# Patient Record
Sex: FEMALE | URBAN - METROPOLITAN AREA
[De-identification: names, ages, dates, MRNs, and addresses within clinical notes are randomized per-mention and may not be internally consistent; named-entity substitution may affect disease eponyms.]

---

## 2022-11-29 ENCOUNTER — OFFICE VISIT (OUTPATIENT)
Dept: PRIMARY CARE CLINIC | Age: 27
End: 2022-11-29
Payer: COMMERCIAL

## 2022-11-29 VITALS
SYSTOLIC BLOOD PRESSURE: 121 MMHG | HEIGHT: 66 IN | HEART RATE: 78 BPM | WEIGHT: 262 LBS | TEMPERATURE: 97.8 F | OXYGEN SATURATION: 100 % | RESPIRATION RATE: 14 BRPM | BODY MASS INDEX: 42.11 KG/M2 | DIASTOLIC BLOOD PRESSURE: 77 MMHG

## 2022-11-29 DIAGNOSIS — Z13.220 SCREENING FOR HYPERLIPIDEMIA: ICD-10-CM

## 2022-11-29 DIAGNOSIS — Z00.00 ROUTINE GENERAL MEDICAL EXAMINATION AT A HEALTH CARE FACILITY: ICD-10-CM

## 2022-11-29 DIAGNOSIS — Z13.1 SCREENING FOR DIABETES MELLITUS: Primary | ICD-10-CM

## 2022-11-29 PROCEDURE — 99395 PREV VISIT EST AGE 18-39: CPT | Performed by: FAMILY MEDICINE

## 2022-11-29 RX ORDER — LEVONORGESTREL 19.5 MG/1
1 INTRAUTERINE DEVICE INTRAUTERINE ONCE
COMMUNITY

## 2022-11-29 NOTE — PROGRESS NOTES
HPI     Chief Complaint   Patient presents with    [facility] Care    Complete Physical     She is a 32 y.o. female who presents to [facility]. Establishing Care    Pmhx : HTN. Scoliosis. Surghx : denies. Fhx : HTN on paternal side. Sochx : Denies tobacco. Uses alcohol on occas. Denies drug use. Admits to unhealthy diet. Works as a dialysis tech. Walks 2-3 days per week for exercise. Previously had HTN and was on spironolactone, and stopped 3-4 months ago. She thinks elevated bp was due to life stressors. Her life stressors have improved. She has seen a counselor for this. She does admit her job is stressful. She is having trouble sleeping, wakes during the night and has trouble going back to sleep. Has cut out caffeine. Has IUD. She reports UTD on pap smears.     - Chronic medical problems:History reviewed. No pertinent past medical history. - Current medications:   Current Outpatient Medications   Medication Sig    levonorgestreL (Kyleena) 17.5 mcg/24 hrs (5 yrs) 19.5 mg IUD IUD 1 Each by IntraUTERine route once. No current facility-administered medications for this visit. - Family history: History reviewed. No pertinent family history. - Allergies: No Known Allergies  - Surgical history: History reviewed. No pertinent surgical history.  - Social history (sexually active, occupation, smoker, etoh use, etc):   Social History     Socioeconomic History    Marital status: UNKNOWN     Spouse name: Not on file    Number of children: Not on file    Years of education: Not on file    Highest education level: Not on file   Occupational History    Not on file   Tobacco Use    Smoking status: Never    Smokeless tobacco: Never   Vaping Use    Vaping Use: Never used   Substance and Sexual Activity    Alcohol use: Yes    Drug use: Never    Sexual activity: Yes     Birth control/protection: I.U.D.    Other Topics Concern    Not on file   Social History Narrative    Not on file     Social Determinants of Health     Financial Resource Strain: Not on file   Food Insecurity: Not on file   Transportation Needs: Not on file   Physical Activity: Not on file   Stress: Not on file   Social Connections: Not on file   Intimate Partner Violence: Not on file   Housing Stability: Not on file         Review of Systems  General : Denies fever, chills, unintentional weight loss. Cardiac : Denies chest pain, shortness of breath, orthopnea, PND. Pulm : Denies coughing, hemoptysis, dyspnea. GI: Denies abd pain, vomiting, change in bowel movements, black/bloody stool. Neuro : Denies syncope or presyncope. Denies focal neuro symptoms. Reviewed PmHx, RxHx, FmHx, SocHx, AllgHx and updated and dated in the chart. Physical Exam:  Visit Vitals  /77 (BP 1 Location: Left upper arm, BP Patient Position: Sitting, BP Cuff Size: Large adult)   Pulse 78   Temp 97.8 °F (36.6 °C) (Temporal)   Resp 14   Ht 5' 6\" (1.676 m)   Wt 262 lb (118.8 kg)   SpO2 100%   BMI 42.29 kg/m²     Physical Exam  Vitals reviewed. Constitutional:       Appearance: Normal appearance. HENT:      Head: Normocephalic and atraumatic. Right Ear: Tympanic membrane, ear canal and external ear normal.      Left Ear: Tympanic membrane, ear canal and external ear normal.      Nose: Nose normal.      Mouth/Throat:      Mouth: Mucous membranes are moist.      Pharynx: Oropharynx is clear. Eyes:      Extraocular Movements: Extraocular movements intact. Conjunctiva/sclera: Conjunctivae normal.      Pupils: Pupils are equal, round, and reactive to light. Cardiovascular:      Rate and Rhythm: Normal rate and regular rhythm. Pulses: Normal pulses. Heart sounds: Normal heart sounds. Pulmonary:      Effort: Pulmonary effort is normal.      Breath sounds: Normal breath sounds. Abdominal:      General: Bowel sounds are normal. There is no distension. Palpations: Abdomen is soft. There is no mass. Tenderness:  There is no abdominal tenderness. Musculoskeletal:      Cervical back: Neck supple. No tenderness. Right lower leg: No edema. Left lower leg: No edema. Lymphadenopathy:      Cervical: No cervical adenopathy. Skin:     General: Skin is warm and dry. Neurological:      General: No focal deficit present. Mental Status: She is alert and oriented to person, place, and time. Psychiatric:         Mood and Affect: Mood normal.         Behavior: Behavior normal.         Thought Content: Thought content normal.              Assessment / Plan     Diagnoses and all orders for this visit:    1. Screening for diabetes mellitus  -     METABOLIC PANEL, BASIC; Future  -     HEMOGLOBIN A1C WITH EAG; Future    2. Screening for hyperlipidemia  -     METABOLIC PANEL, BASIC; Future  -     LIPID PANEL; Future    3. Routine general medical examination at a health care facility  -     METABOLIC PANEL, BASIC; Future  -     LIPID PANEL; Future  -     HEMOGLOBIN A1C WITH EAG; Future     encouraged and discussed hh diet and regular exercise. Labs and follow up as indicated. Health screening form to be completed when labs resulted. States she is UTD on papsmear, last pap within past 1 year was normal.  States she is UTD on tetanus immunization but can't remember the date. I have discussed the diagnosis with the patient and the intended plan as seen in the above orders.     I have discussed medication side effects and     Dayla Osgood, DO

## 2022-11-29 NOTE — PROGRESS NOTES
Chief Complaint   Patient presents with    Establish Care    Complete Physical     Visit Vitals  /77 (BP 1 Location: Left upper arm, BP Patient Position: Sitting, BP Cuff Size: Large adult)   Pulse 78   Temp 97.8 °F (36.6 °C) (Temporal)   Resp 14   Ht 5' 6\" (1.676 m)   Wt 262 lb (118.8 kg)   SpO2 100%   BMI 42.29 kg/m²   1. \"Have you been to the ER, urgent care clinic since your last visit? Hospitalized since your last visit? \" no    2. \"Have you seen or consulted any other health care providers outside of the [address] [place] [address] since your last visit? \" no         5. For patients aged 21-65: Has the patient had a pap smear?  Up to date [facility] Dr. Linda Russell 03/07/22

## 2023-02-21 ENCOUNTER — OFFICE VISIT (OUTPATIENT)
Dept: PRIMARY CARE CLINIC | Age: 28
End: 2023-02-21
Payer: COMMERCIAL

## 2023-02-21 VITALS
OXYGEN SATURATION: 99 % | BODY MASS INDEX: 43.91 KG/M2 | HEIGHT: 66 IN | RESPIRATION RATE: 16 BRPM | HEART RATE: 83 BPM | DIASTOLIC BLOOD PRESSURE: 84 MMHG | TEMPERATURE: 97.1 F | SYSTOLIC BLOOD PRESSURE: 127 MMHG | WEIGHT: 273.2 LBS

## 2023-02-21 DIAGNOSIS — L68.9 EXCESS BODY AND FACIAL HAIR: Primary | ICD-10-CM

## 2023-02-21 DIAGNOSIS — L68.0 HIRSUTISM: ICD-10-CM

## 2023-02-21 DIAGNOSIS — R21 RASH: ICD-10-CM

## 2023-02-21 PROCEDURE — 99214 OFFICE O/P EST MOD 30 MIN: CPT | Performed by: FAMILY MEDICINE

## 2023-02-21 NOTE — PROGRESS NOTES
Chief Complaint   Patient presents with    Rash     Chest and back    Medication Refill     Visit Vitals  /84 (BP 1 Location: Left upper arm, BP Patient Position: Sitting, BP Cuff Size: Small adult)   Pulse 83   Temp 97.1 °F (36.2 °C) (Temporal)   Resp 16   Ht 5' 6\" (1.676 m)   Wt 273 lb 3.2 oz (123.9 kg)   SpO2 99%   BMI 44.10 kg/m²

## 2023-02-21 NOTE — PROGRESS NOTES
Subjective  Kymberly Luu is an 32 y.o. female who presents for acute complaints. Pmhx : HTN. Scoliosis. C/o rash on trunk. Onset about a month ago. Breakout of sore red bumps on upper chest and upper back. Seems to be resolving. No new rashes. Rash is not itchy. Interested in Steve for weight loss. She has not been doing well with complying with diet or exercise. Denies fhx of thyroid cancer. Hx of oligomenorrhea prior to IUD. IUD placed 2 years ago. C/o unwanted facial hair, chronic. Has struggled with her weight over the past few years. Allergies - reviewed:   No Known Allergies      Medications - reviewed:   Current Outpatient Medications   Medication Sig    levonorgestreL (Kyleena) 17.5 mcg/24 hrs (5 yrs) 19.5 mg IUD IUD 1 Each by IntraUTERine route once. No current facility-administered medications for this visit. Past Medical History - reviewed:  No past medical history on file. Past Surgical History - reviewed:   No past surgical history on file. Social History - reviewed:  Social History     Socioeconomic History    Marital status: UNKNOWN     Spouse name: Not on file    Number of children: Not on file    Years of education: Not on file    Highest education level: Not on file   Occupational History    Not on file   Tobacco Use    Smoking status: Never    Smokeless tobacco: Never   Vaping Use    Vaping Use: Never used   Substance and Sexual Activity    Alcohol use: Yes    Drug use: Never    Sexual activity: Yes     Birth control/protection: I.U.D.    Other Topics Concern    Not on file   Social History Narrative    Not on file     Social Determinants of Health     Financial Resource Strain: Low Risk     Difficulty of Paying Living Expenses: Not very hard   Food Insecurity: Food Insecurity Present    Worried About Running Out of Food in the Last Year: Sometimes true    Ran Out of Food in the Last Year: Never true   Transportation Needs: Not on file   Physical Activity: Not on file   Stress: Not on file   Social Connections: Not on file   Intimate Partner Violence: Not on file   Housing Stability: Not on file         Family History - reviewed:  No family history on file. ROS  CONSTITUTIONAL: Denies fever, chills, unintentional weight loss. CARDIOVASCULAR: Denies chest pain, orthopnea, PND. RESPIRATORY: Denies cough, dyspnea, wheezing, hemoptysis. GI: Denies abdominal pain, diarrhea, constipation, diarrhea, black or bloody stool. Physical Exam  Visit Vitals  /84 (BP 1 Location: Left upper arm, BP Patient Position: Sitting, BP Cuff Size: Small adult)   Pulse 83   Temp 97.1 °F (36.2 °C) (Temporal)   Resp 16   Ht 5' 6\" (1.676 m)   Wt 273 lb 3.2 oz (123.9 kg)   SpO2 99%   BMI 44.10 kg/m²     Physical Exam  Vitals reviewed. Constitutional:       Appearance: Normal appearance. HENT:      Head: Normocephalic and atraumatic. Cardiovascular:      Rate and Rhythm: Normal rate and regular rhythm. Pulses: Normal pulses. Heart sounds: Normal heart sounds. Pulmonary:      Effort: Pulmonary effort is normal.      Breath sounds: Normal breath sounds. Musculoskeletal:      Right lower leg: No edema. Left lower leg: No edema. Skin:     General: Skin is warm and dry. Comments: Hyperpigementated macular rash scattered on upper chest and upper back, appears to be resolving. Neurological:      Mental Status: She is alert. Assessment/Plan  Diagnoses and all orders for this visit:    1. Excess body and facial hair : discussed therapeutic options. She declines rx at this time.   -     METABOLIC PANEL, BASIC; Future    2. Hirsutism  -     TESTOSTERONE, FREE & TOTAL; Future  -     17-OH PROGESTERONE LCMS; Future    3. Rash : appears to be resolving. Consistent with acne outbreak. Follow up prn. Discussed healthy diet, regular exercise, weight loss recommendations. Discussed wegovy therapy, side effects, risks.  She declines this today but given info to read about rx. Advised she consider a structured program such as weight watchers. I have discussed the diagnosis with the patient and the intended plan as seen in the above orders. Patient verbalized understanding of the plan and agrees with the plan. I have discussed medication side effects and warnings with the patient as well. Informed patient to return to the office if new symptoms arise.         Leland Nunez, DO

## 2024-07-08 ENCOUNTER — HOSPITAL ENCOUNTER (OUTPATIENT)
Facility: HOSPITAL | Age: 29
Discharge: HOME | End: 2024-07-11
Payer: COMMERCIAL

## 2024-07-08 DIAGNOSIS — N18.9 CHRONIC KIDNEY DISEASE, UNSPECIFIED CKD STAGE: ICD-10-CM

## 2024-07-08 PROCEDURE — 76770 US EXAM ABDO BACK WALL COMP: CPT
